# Patient Record
Sex: MALE | Race: WHITE | NOT HISPANIC OR LATINO | URBAN - METROPOLITAN AREA
[De-identification: names, ages, dates, MRNs, and addresses within clinical notes are randomized per-mention and may not be internally consistent; named-entity substitution may affect disease eponyms.]

---

## 2018-03-01 ENCOUNTER — NEW PATIENT (OUTPATIENT)
Dept: URBAN - METROPOLITAN AREA CLINIC 60 | Facility: CLINIC | Age: 28
End: 2018-03-01
Payer: COMMERCIAL

## 2018-03-01 DIAGNOSIS — H52.13 MYOPIA, BILATERAL: Primary | ICD-10-CM

## 2018-03-01 PROCEDURE — 92015 DETERMINE REFRACTIVE STATE: CPT | Performed by: OPTOMETRIST

## 2018-03-01 PROCEDURE — 92310 CONTACT LENS FITTING OU: CPT | Performed by: OPTOMETRIST

## 2018-03-01 PROCEDURE — 92004 COMPRE OPH EXAM NEW PT 1/>: CPT | Performed by: OPTOMETRIST

## 2018-03-01 ASSESSMENT — VISUAL ACUITY
OD: 20/20
OS: 20/20

## 2018-03-01 ASSESSMENT — INTRAOCULAR PRESSURE
OD: 20
OS: 19

## 2018-04-19 ENCOUNTER — FOLLOW UP ESTABLISHED (OUTPATIENT)
Dept: URBAN - METROPOLITAN AREA CLINIC 60 | Facility: CLINIC | Age: 28
End: 2018-04-19
Payer: COMMERCIAL

## 2018-04-19 DIAGNOSIS — H52.7 REFRACTIVE ERROR: Primary | ICD-10-CM

## 2018-04-19 PROCEDURE — 92015 DETERMINE REFRACTIVE STATE: CPT | Performed by: OPTOMETRIST

## 2021-10-27 ENCOUNTER — HOSPITAL ENCOUNTER (EMERGENCY)
Facility: MEDICAL CENTER | Age: 31
End: 2021-10-27
Attending: EMERGENCY MEDICINE
Payer: COMMERCIAL

## 2021-10-27 ENCOUNTER — APPOINTMENT (OUTPATIENT)
Dept: RADIOLOGY | Facility: MEDICAL CENTER | Age: 31
End: 2021-10-27
Attending: EMERGENCY MEDICINE
Payer: COMMERCIAL

## 2021-10-27 VITALS
HEIGHT: 72 IN | BODY MASS INDEX: 25.06 KG/M2 | SYSTOLIC BLOOD PRESSURE: 148 MMHG | RESPIRATION RATE: 18 BRPM | TEMPERATURE: 98.7 F | HEART RATE: 98 BPM | OXYGEN SATURATION: 98 % | WEIGHT: 185 LBS | DIASTOLIC BLOOD PRESSURE: 89 MMHG

## 2021-10-27 DIAGNOSIS — R56.9 SEIZURE (HCC): ICD-10-CM

## 2021-10-27 LAB
ALBUMIN SERPL BCP-MCNC: 4.3 G/DL (ref 3.2–4.9)
ALBUMIN/GLOB SERPL: 1.9 G/DL
ALP SERPL-CCNC: 84 U/L (ref 30–99)
ALT SERPL-CCNC: 21 U/L (ref 2–50)
ANION GAP SERPL CALC-SCNC: 13 MMOL/L (ref 7–16)
AST SERPL-CCNC: 31 U/L (ref 12–45)
BASOPHILS # BLD AUTO: 0.4 % (ref 0–1.8)
BASOPHILS # BLD: 0.07 K/UL (ref 0–0.12)
BILIRUB SERPL-MCNC: 0.2 MG/DL (ref 0.1–1.5)
BUN SERPL-MCNC: 12 MG/DL (ref 8–22)
CALCIUM SERPL-MCNC: 8.7 MG/DL (ref 8.5–10.5)
CHLORIDE SERPL-SCNC: 106 MMOL/L (ref 96–112)
CO2 SERPL-SCNC: 21 MMOL/L (ref 20–33)
CREAT SERPL-MCNC: 1.17 MG/DL (ref 0.5–1.4)
EOSINOPHIL # BLD AUTO: 0.11 K/UL (ref 0–0.51)
EOSINOPHIL NFR BLD: 0.7 % (ref 0–6.9)
ERYTHROCYTE [DISTWIDTH] IN BLOOD BY AUTOMATED COUNT: 42.7 FL (ref 35.9–50)
GLOBULIN SER CALC-MCNC: 2.3 G/DL (ref 1.9–3.5)
GLUCOSE SERPL-MCNC: 94 MG/DL (ref 65–99)
HCT VFR BLD AUTO: 50.5 % (ref 42–52)
HGB BLD-MCNC: 16.9 G/DL (ref 14–18)
IMM GRANULOCYTES # BLD AUTO: 0.13 K/UL (ref 0–0.11)
IMM GRANULOCYTES NFR BLD AUTO: 0.8 % (ref 0–0.9)
LYMPHOCYTES # BLD AUTO: 1.13 K/UL (ref 1–4.8)
LYMPHOCYTES NFR BLD: 7.1 % (ref 22–41)
MCH RBC QN AUTO: 30.3 PG (ref 27–33)
MCHC RBC AUTO-ENTMCNC: 33.5 G/DL (ref 33.7–35.3)
MCV RBC AUTO: 90.7 FL (ref 81.4–97.8)
MONOCYTES # BLD AUTO: 0.57 K/UL (ref 0–0.85)
MONOCYTES NFR BLD AUTO: 3.6 % (ref 0–13.4)
NEUTROPHILS # BLD AUTO: 14 K/UL (ref 1.82–7.42)
NEUTROPHILS NFR BLD: 87.4 % (ref 44–72)
NRBC # BLD AUTO: 0 K/UL
NRBC BLD-RTO: 0 /100 WBC
PLATELET # BLD AUTO: 311 K/UL (ref 164–446)
PMV BLD AUTO: 9.6 FL (ref 9–12.9)
POTASSIUM SERPL-SCNC: 4.5 MMOL/L (ref 3.6–5.5)
PROT SERPL-MCNC: 6.6 G/DL (ref 6–8.2)
RBC # BLD AUTO: 5.57 M/UL (ref 4.7–6.1)
SODIUM SERPL-SCNC: 140 MMOL/L (ref 135–145)
WBC # BLD AUTO: 16 K/UL (ref 4.8–10.8)

## 2021-10-27 PROCEDURE — 85025 COMPLETE CBC W/AUTO DIFF WBC: CPT

## 2021-10-27 PROCEDURE — 70450 CT HEAD/BRAIN W/O DYE: CPT

## 2021-10-27 PROCEDURE — A9270 NON-COVERED ITEM OR SERVICE: HCPCS | Performed by: EMERGENCY MEDICINE

## 2021-10-27 PROCEDURE — 700102 HCHG RX REV CODE 250 W/ 637 OVERRIDE(OP): Performed by: EMERGENCY MEDICINE

## 2021-10-27 PROCEDURE — 80053 COMPREHEN METABOLIC PANEL: CPT

## 2021-10-27 PROCEDURE — 99284 EMERGENCY DEPT VISIT MOD MDM: CPT

## 2021-10-27 RX ORDER — CHLORDIAZEPOXIDE HYDROCHLORIDE 25 MG/1
25 CAPSULE, GELATIN COATED ORAL ONCE
Status: COMPLETED | OUTPATIENT
Start: 2021-10-27 | End: 2021-10-27

## 2021-10-27 RX ORDER — CHLORDIAZEPOXIDE HYDROCHLORIDE 10 MG/1
10 CAPSULE, GELATIN COATED ORAL 3 TIMES DAILY PRN
Qty: 5 CAPSULE | Refills: 0 | Status: SHIPPED | OUTPATIENT
Start: 2021-10-27 | End: 2021-10-27 | Stop reason: SDUPTHER

## 2021-10-27 RX ORDER — CHLORDIAZEPOXIDE HYDROCHLORIDE 10 MG/1
10 CAPSULE, GELATIN COATED ORAL 3 TIMES DAILY PRN
Qty: 5 CAPSULE | Refills: 0 | Status: SHIPPED | OUTPATIENT
Start: 2021-10-27 | End: 2021-10-30

## 2021-10-27 RX ADMIN — CHLORDIAZEPOXIDE HYDROCHLORIDE 25 MG: 25 CAPSULE ORAL at 11:59

## 2021-10-27 NOTE — ED PROVIDER NOTES
ED Provider Note    ED Provider Note    Primary care provider: Pcp Pt States None  Means of arrival: EMS  History obtained from: Patient    CHIEF COMPLAINT  Chief Complaint   Patient presents with   • Seizure     BIB EMS from home where pt's mother found him to be havnig a tonic clonic like seizure activity. +oral trauma. -incontinence     Seen at 11:21 AM.   HPI  Jani Henry is a 31 y.o. male who presents to the Emergency Department for possible alcohol withdrawal. Patient denies any history of seizures. He does drink nightly, his alcohol consumption consists of 2 to 3 alcoholic beverages, he does not usually drink in the morning. Over the past few days he has decreased his alcohol consumption and is just had a few beers. He is amnesty to the event but apparently had a generalized tonic chronic seizure and suffered a tongue laceration. He is back to his usual state of health. He denies any headache, numbness, weakness, visual changes, nausea or vomiting. He does have a prescription for Xanax, he states his last Xanax was taken probably three or four days ago. He moved here from the [facility], he still is intending to follow up with his treating psychiatrist down there.    REVIEW OF SYSTEMS  See HPI,   Remainder of ROS negative.     PAST MEDICAL HISTORY   has a past medical history of Anxiety and Bipolar 1 disorder (HCC).    SURGICAL HISTORY  patient denies any surgical history    SOCIAL HISTORY  Social History     Tobacco Use   • Smoking status: Never Smoker   • Smokeless tobacco: Never Used   Vaping Use   • Vaping Use: Never used   Substance Use Topics   • Alcohol use: Yes     Comment: high etoh    • Drug use: Not Currently      Social History     Substance and Sexual Activity   Drug Use Not Currently       FAMILY HISTORY  History reviewed. No pertinent family history.    CURRENT MEDICATIONS  Reviewed.  See Encounter Summary.     ALLERGIES  No Known Allergies    PHYSICAL EXAM  VITAL SIGNS: /89   Pulse 98    Temp 37.1 °C (98.7 °F)   Resp 18   Ht 1.829 m (6')   Wt 83.9 kg (185 lb)   SpO2 98%   BMI 25.09 kg/m²   Constitutional: Awake, alert in no apparent distress.   HENT: Normocephalic, Bilateral external ears normal. Nose normal. Ecchymosis noted to the right tip of the tongue, no active bleeding  Eyes: Conjunctiva normal, non-icteric, EOMI.    Thorax & Lungs: Easy unlabored respirations, Clear to ascultation bilaterally.  Cardiovascular: Regular rate, Regular rhythm, No murmurs, rubs or gallops. Bilateral pulses symmetrical.   Abdomen:  Soft, nontender, nondistended, normal active bowel sounds.   :    Skin: Visualized skin is  Dry, No erythema, No rash.   Musculoskeletal:   No cyanosis, clubbing or edema. No leg asymmetry.   Neurologic: Alert, Grossly non-focal. Cranial nerves two through 12 intact, normal speech, no dysmetria, no drift, non-tremulous.  Psychiatric: Normal affect, Normal mood  Lymphatic:  No cervical LAD         RADIOLOGY  CT-HEAD W/O   Final Result      Head CT without contrast within normal limits. No evidence of acute cerebral infarction, hemorrhage or mass lesion.            COURSE & MEDICAL DECISION MAKING  Pertinent Labs & Imaging studies reviewed. (See chart for details)    Differential diagnoses include but are not limited to: most likely seizure, other consideration would be alcohol withdrawal, mass occupying lesion, electrolyte abnormalities    11:21 AM - Medical record reviewed, history of bipolar, anxiety, last visit with psychiatry approximately three weeks ago in [facility] where the patient had 45 tablets of Xanax failed.    Decision Making:  This is a pleasant 31 y.o. year old male who presents with a single generalized tonic clonic seizure. The patient currently does not demonstrate DTs. I suspect that this is multifactorial. Likely the patient had a combination of mild benzo and mild alcohol withdrawal which was causing a single breakthrough seizure today. Last imaging  of the brain was in 2007, therefore a repeat head CT was done today to rule out mass activating lesion. CT is negative. Labs are unremarkable, with exception of WBC of 16 initial predominance, presumably stress response. The patient is back to baseline. With this clinical scenario he does not require long-term antiepileptics. I will give him a few tablets of Librium to prevent further seizures for the next 24 hours and referred him to the health clinic for detox if needed.    Discharge Medications:  Discharge Medication List as of 10/27/2021 12:59 PM          The patient was discharged home (see d/c instructions) was told to return immediately for any signs or symptoms listed, or any worsening at all.  The patient verbally agreed to the discharge precautions and follow-up plan which is documented in EPIC.    In prescribing controlled substances to this patient, I certify that I have obtained and reviewed the medical history of Jani Henry. I have also made a good kaylan effort to obtain applicable records from other providers who have treated the patient and records did not demonstrate any increased risk of substance abuse that would prevent me from prescribing controlled substances.     I have conducted a physical exam and documented it. I have reviewed Mr. Henry’s prescription history as maintained by the [place] Prescription Monitoring Program.             FINAL IMPRESSION  1. Seizure (HCC)

## 2021-10-27 NOTE — ED TRIAGE NOTES
Chief Complaint   Patient presents with   • Seizure     BIB EMS from home where pt's mother found him to be havnig a tonic clonic like seizure activity. +oral trauma. -incontinence     Pt reports that he normally drinks heavily daily, has not had any ETOH last 2 days. Pt reports that he told his brother yesterday that he though he may be having alcohol withdrawal.    Pt placed on monitor. Chart up for ERP

## 2021-11-28 ENCOUNTER — HOSPITAL ENCOUNTER (EMERGENCY)
Facility: MEDICAL CENTER | Age: 31
End: 2021-11-28
Attending: EMERGENCY MEDICINE

## 2021-11-28 VITALS
HEIGHT: 72 IN | BODY MASS INDEX: 25.06 KG/M2 | WEIGHT: 185 LBS | RESPIRATION RATE: 18 BRPM | OXYGEN SATURATION: 92 % | HEART RATE: 84 BPM | DIASTOLIC BLOOD PRESSURE: 73 MMHG | TEMPERATURE: 99 F | SYSTOLIC BLOOD PRESSURE: 119 MMHG

## 2021-11-28 DIAGNOSIS — R56.9 SEIZURE (HCC): ICD-10-CM

## 2021-11-28 LAB
ALBUMIN SERPL BCP-MCNC: 2.9 G/DL (ref 3.2–4.9)
ALBUMIN/GLOB SERPL: 2.2 G/DL
ALP SERPL-CCNC: 59 U/L (ref 30–99)
ALT SERPL-CCNC: 10 U/L (ref 2–50)
AMPHET UR QL SCN: NEGATIVE
ANION GAP SERPL CALC-SCNC: 8 MMOL/L (ref 7–16)
AST SERPL-CCNC: 12 U/L (ref 12–45)
BARBITURATES UR QL SCN: NEGATIVE
BASOPHILS # BLD AUTO: 0.9 % (ref 0–1.8)
BASOPHILS # BLD: 0.08 K/UL (ref 0–0.12)
BENZODIAZ UR QL SCN: NEGATIVE
BILIRUB SERPL-MCNC: 0.3 MG/DL (ref 0.1–1.5)
BUN SERPL-MCNC: 7 MG/DL (ref 8–22)
BZE UR QL SCN: NEGATIVE
CA-I SERPL-SCNC: 1.2 MMOL/L (ref 1.1–1.3)
CALCIUM SERPL-MCNC: 6.3 MG/DL (ref 8.5–10.5)
CANNABINOIDS UR QL SCN: POSITIVE
CHLORIDE SERPL-SCNC: 114 MMOL/L (ref 96–112)
CO2 SERPL-SCNC: 21 MMOL/L (ref 20–33)
CREAT SERPL-MCNC: 0.71 MG/DL (ref 0.5–1.4)
EOSINOPHIL # BLD AUTO: 0.23 K/UL (ref 0–0.51)
EOSINOPHIL NFR BLD: 2.6 % (ref 0–6.9)
ERYTHROCYTE [DISTWIDTH] IN BLOOD BY AUTOMATED COUNT: 43.5 FL (ref 35.9–50)
ETHANOL BLD-MCNC: <10.1 MG/DL (ref 0–10)
GLOBULIN SER CALC-MCNC: 1.3 G/DL (ref 1.9–3.5)
GLUCOSE SERPL-MCNC: 86 MG/DL (ref 65–99)
HCT VFR BLD AUTO: 49.1 % (ref 42–52)
HGB BLD-MCNC: 16.3 G/DL (ref 14–18)
IMM GRANULOCYTES # BLD AUTO: 0.05 K/UL (ref 0–0.11)
IMM GRANULOCYTES NFR BLD AUTO: 0.6 % (ref 0–0.9)
LYMPHOCYTES # BLD AUTO: 2.32 K/UL (ref 1–4.8)
LYMPHOCYTES NFR BLD: 26 % (ref 22–41)
MAGNESIUM SERPL-MCNC: 1.6 MG/DL (ref 1.5–2.5)
MCH RBC QN AUTO: 30.8 PG (ref 27–33)
MCHC RBC AUTO-ENTMCNC: 33.2 G/DL (ref 33.7–35.3)
MCV RBC AUTO: 92.8 FL (ref 81.4–97.8)
METHADONE UR QL SCN: NEGATIVE
MONOCYTES # BLD AUTO: 0.7 K/UL (ref 0–0.85)
MONOCYTES NFR BLD AUTO: 7.8 % (ref 0–13.4)
NEUTROPHILS # BLD AUTO: 5.54 K/UL (ref 1.82–7.42)
NEUTROPHILS NFR BLD: 62.1 % (ref 44–72)
NRBC # BLD AUTO: 0 K/UL
NRBC BLD-RTO: 0 /100 WBC
OPIATES UR QL SCN: NEGATIVE
OXYCODONE UR QL SCN: NEGATIVE
PCP UR QL SCN: NEGATIVE
PLATELET # BLD AUTO: 354 K/UL (ref 164–446)
PMV BLD AUTO: 9.6 FL (ref 9–12.9)
POTASSIUM SERPL-SCNC: 3 MMOL/L (ref 3.6–5.5)
PROPOXYPH UR QL SCN: NEGATIVE
PROT SERPL-MCNC: 4.2 G/DL (ref 6–8.2)
RBC # BLD AUTO: 5.29 M/UL (ref 4.7–6.1)
SODIUM SERPL-SCNC: 143 MMOL/L (ref 135–145)
WBC # BLD AUTO: 8.9 K/UL (ref 4.8–10.8)

## 2021-11-28 PROCEDURE — 80307 DRUG TEST PRSMV CHEM ANLYZR: CPT

## 2021-11-28 PROCEDURE — 99284 EMERGENCY DEPT VISIT MOD MDM: CPT

## 2021-11-28 PROCEDURE — 82077 ASSAY SPEC XCP UR&BREATH IA: CPT

## 2021-11-28 PROCEDURE — 700102 HCHG RX REV CODE 250 W/ 637 OVERRIDE(OP): Performed by: EMERGENCY MEDICINE

## 2021-11-28 PROCEDURE — A9270 NON-COVERED ITEM OR SERVICE: HCPCS | Performed by: EMERGENCY MEDICINE

## 2021-11-28 PROCEDURE — 36415 COLL VENOUS BLD VENIPUNCTURE: CPT

## 2021-11-28 PROCEDURE — 83735 ASSAY OF MAGNESIUM: CPT

## 2021-11-28 PROCEDURE — 80053 COMPREHEN METABOLIC PANEL: CPT

## 2021-11-28 PROCEDURE — 82330 ASSAY OF CALCIUM: CPT

## 2021-11-28 PROCEDURE — 85025 COMPLETE CBC W/AUTO DIFF WBC: CPT

## 2021-11-28 RX ORDER — PHENYTOIN SODIUM 100 MG/1
100 CAPSULE, EXTENDED RELEASE ORAL 3 TIMES DAILY
Qty: 90 CAPSULE | Refills: 11 | Status: SHIPPED | OUTPATIENT
Start: 2021-11-28 | End: 2022-11-20

## 2021-11-28 RX ORDER — PHENYTOIN SODIUM 100 MG/1
300 CAPSULE, EXTENDED RELEASE ORAL ONCE
Status: COMPLETED | OUTPATIENT
Start: 2021-11-28 | End: 2021-11-28

## 2021-11-28 RX ORDER — POTASSIUM CHLORIDE 20 MEQ/1
40 TABLET, EXTENDED RELEASE ORAL ONCE
Status: COMPLETED | OUTPATIENT
Start: 2021-11-28 | End: 2021-11-28

## 2021-11-28 RX ADMIN — POTASSIUM CHLORIDE 40 MEQ: 1500 TABLET, EXTENDED RELEASE ORAL at 09:58

## 2021-11-28 RX ADMIN — PHENYTOIN SODIUM 300 MG: 100 CAPSULE ORAL at 11:54

## 2021-11-28 ASSESSMENT — FIBROSIS 4 INDEX: FIB4 SCORE: 0.7

## 2021-11-28 NOTE — PROGRESS NOTES
Brief neurology note    31-year-old male presents with seizure activity.  He said a couple others.  One was in October at this facility.  Just moved here from [facility].  Underlying alcohol abuse.  Takes Xanax at times.  In October is felt the seizure is most likely due to combination of alcohol withdrawal possibly benzo withdrawal.  Underlying psychological disorders including bipolar and depression.  Chart review he was going to a psychiatry clinic in [facility].  Alcohol levels negative today.  UDS is still pending.  Given this combination of issues.  I agree with starting an AED.  However choice of the medication is complicated.  Would not give him Keppra due to the underlying psych disorders as Keppra can make this worse.  With the alcohol abuse and possibly other drug abuse would not use Depakote right now.  The best option in my opinion the long-term would be Lamictal however this will take multiple months tapering.  In the meantime we can start Dilantin 300 mg nightly.  He will need to follow-up in the neurology clinic get an EEG.  Also possibly an MRI brain.  Please note this Dilantin recommendation is only for the short-term.  Long-term needs to be on something else such as Lamictal or maybe Topamax.      Jonas Mondragon M.D.

## 2021-11-28 NOTE — ED NOTES
Tech from Lab called with critical result of calcium of 6.3 at 0923. Critical lab result read back to .   Dr. Baxter notified of critical lab result at 0924.  Critical lab result read back by Dr. Baxter.

## 2021-11-28 NOTE — ED TRIAGE NOTES
"Jani Henry 31 y.o. male bib EMS from home for     Chief Complaint   Patient presents with   • Seizure     found by family down actively seizing, unknown time.  Pt reports \"this is the 3rd time it has happened\".  Pt does not remember if he takes medications for SZ.   • Tongue Swelling     pt reports biting his tongue, c/o mild pain     Report was rec'd by Roro AMIN.  PIV placed by EMS pta with FSBS 136.  Blood drawn on arrival by NOC rn, sent to the lab.  /69   Pulse 89   Temp 36.4 °C (97.6 °F) (Temporal)   Resp 14   Ht 1.829 m (6')   Wt 83.9 kg (185 lb)   SpO2 95%   BMI 25.09 kg/m²     "

## 2021-11-28 NOTE — ED NOTES
Pt given d/c instructions, f/u info and aware of RX x 1 for  with verbal understanding.  VSS at discharge.  PIV d/c'd with tip intact.  Pt ambulatory from the ED w/ steady gait accompanied by family member.  All belongings in possession on discharge.  Pt escorted to the lobby by RN.

## 2021-11-28 NOTE — ED PROVIDER NOTES
"ED Provider Note    CHIEF COMPLAINT  Chief Complaint   Patient presents with   • Seizure     found by family down actively seizing, unknown time.  Pt reports \"this is the 3rd time it has happened\".  Pt does not remember if he takes medications for SZ.   • Tongue Swelling     pt reports biting his tongue, c/o mild pain       HPI  Jani Henry is a 31 y.o. male who presents to the emergency department after having a seizure.  Patient has a history of previous seizures.  He had one in May and then in July.  On review of the chart he had one in October of this year.  His first 2 were attributed to alcohol as he is a heavy drinker and had stopped drinking.  He has a history of anxiety.  He was taking Xanax up until about 3 weeks ago when this was discontinued.  He denies any abrupt cessation of alcohol with this event.  Does not know what happened.  He is currently living with family.  He was making some noises and he was checked on.  He was identified to have generalized tonic-clonic activity.  Paramedics were called.  Seizure aborted spontaneously.  He is confused regarding the event.  His only complaint is of tongue pain.  He has pain on the tip and the right side of the tongue.  Throbbing nonradiating constant.  Denies a headache.  No focal weakness numbness.  Denies extremity injury, chest pain, shortness of breath, fevers or illness.    REVIEW OF SYSTEMS  As per HPI, otherwise a 10 point review of systems is negative    PAST MEDICAL HISTORY  Past Medical History:   Diagnosis Date   • Anxiety    • Bipolar 1 disorder (HCC)        Family history  No pertinent family medical history    SOCIAL HISTORY  Social History     Tobacco Use   • Smoking status: Never Smoker   • Smokeless tobacco: Never Used   Vaping Use   • Vaping Use: Never used   Substance Use Topics   • Alcohol use: Yes     Comment: heavy etoh    • Drug use: Not Currently       SURGICAL HISTORY  No past surgical history on file.    CURRENT MEDICATIONS  Home " Medications    **Home medications have not yet been reviewed for this encounter**         ALLERGIES  No Known Allergies    PHYSICAL EXAM  VITAL SIGNS: /69   Pulse 89   Temp 36.4 °C (97.6 °F) (Temporal)   Resp 14   Ht 1.829 m (6')   Wt 83.9 kg (185 lb)   SpO2 95%   BMI 25.09 kg/m²    Constitutional: Awake and alert  HENT: Contusion on the tip and right side of the tongue.  Eyes: Normal inspection  Neck: Grossly normal range of motion.  Cardiovascular: Normal heart rate, Normal rhythm.  Symmetric peripheral pulses.   Thorax & Lungs: No respiratory distress, No wheezing, No rales, No rhonchi, No chest tenderness.   Abdomen: Bowel sounds normal, soft, non-distended, nontender, no mass  Skin: No obvious rash.  Back: No tenderness  Extremities: Well perfused  Neurologic: Awake alert oriented to person and place.  Disoriented to time.  Moves all extremities symmetrically.  Normal sensory.  Psychiatric: Normal for situation        Labs:  Results for orders placed or performed during the hospital encounter of 11/28/21   CBC WITH DIFFERENTIAL   Result Value Ref Range    WBC 8.9 4.8 - 10.8 K/uL    RBC 5.29 4.70 - 6.10 M/uL    Hemoglobin 16.3 14.0 - 18.0 g/dL    Hematocrit 49.1 42.0 - 52.0 %    MCV 92.8 81.4 - 97.8 fL    MCH 30.8 27.0 - 33.0 pg    MCHC 33.2 (L) 33.7 - 35.3 g/dL    RDW 43.5 35.9 - 50.0 fL    Platelet Count 354 164 - 446 K/uL    MPV 9.6 9.0 - 12.9 fL    Neutrophils-Polys 62.10 44.00 - 72.00 %    Lymphocytes 26.00 22.00 - 41.00 %    Monocytes 7.80 0.00 - 13.40 %    Eosinophils 2.60 0.00 - 6.90 %    Basophils 0.90 0.00 - 1.80 %    Immature Granulocytes 0.60 0.00 - 0.90 %    Nucleated RBC 0.00 /100 WBC    Neutrophils (Absolute) 5.54 1.82 - 7.42 K/uL    Lymphs (Absolute) 2.32 1.00 - 4.80 K/uL    Monos (Absolute) 0.70 0.00 - 0.85 K/uL    Eos (Absolute) 0.23 0.00 - 0.51 K/uL    Baso (Absolute) 0.08 0.00 - 0.12 K/uL    Immature Granulocytes (abs) 0.05 0.00 - 0.11 K/uL    NRBC (Absolute) 0.00 K/uL   Comp  Metabolic Panel   Result Value Ref Range    Sodium 143 135 - 145 mmol/L    Potassium 3.0 (L) 3.6 - 5.5 mmol/L    Chloride 114 (H) 96 - 112 mmol/L    Co2 21 20 - 33 mmol/L    Anion Gap 8.0 7.0 - 16.0    Glucose 86 65 - 99 mg/dL    Bun 7 (L) 8 - 22 mg/dL    Creatinine 0.71 0.50 - 1.40 mg/dL    Calcium 6.3 (LL) 8.5 - 10.5 mg/dL    AST(SGOT) 12 12 - 45 U/L    ALT(SGPT) 10 2 - 50 U/L    Alkaline Phosphatase 59 30 - 99 U/L    Total Bilirubin 0.3 0.1 - 1.5 mg/dL    Albumin 2.9 (L) 3.2 - 4.9 g/dL    Total Protein 4.2 (L) 6.0 - 8.2 g/dL    Globulin 1.3 (L) 1.9 - 3.5 g/dL    A-G Ratio 2.2 g/dL   DIAGNOSTIC ALCOHOL   Result Value Ref Range    Diagnostic Alcohol <10.1 0.0 - 10.0 mg/dL   MAGNESIUM   Result Value Ref Range    Magnesium 1.6 1.5 - 2.5 mg/dL   ESTIMATED GFR   Result Value Ref Range    GFR If African American >60 >60 mL/min/1.73 m 2    GFR If Non African American >60 >60 mL/min/1.73 m 2   IONIZED CALCIUM   Result Value Ref Range    Ionized Calcium 1.2 1.1 - 1.3 mmol/L       COURSE & MEDICAL DECISION MAKING  Patient presents to the ER with reported seizure.  He is slightly confused on arrival but not frankly postictal.  His neurologic examination was otherwise unremarkable.  Reviewed his chart as noted above.  Ordered screening laboratory data.    Laboratory data demonstrates low potassium.  Patient was given K-Dur orally.  His calcium level is also low direct ionized calcium returned normal.    His neurologic status completely returned to normal.  I consulted neurology.  Reviewed the case with Dr. Mondragon.  Advised initiation of Dilantin and follow-up in the epilepsy clinic.  Placed referral to neurology as well as with our .  Given first dose of Dilantin in the emergency department and a prescription for 100 mg 3 times daily.  Discussed patient's problematic drinking.  He did not want any resources or help with any detox as he did not believe this was much of an issue and he thought he could stop.  Patient  admitted to using kratom on a regular basis.  I advised against this.  At this point the patient is stable.  He should return to the ER for recurrent seizures or concerning symptoms.    FINAL IMPRESSION  1.  Seizure    Driving restrictions were given.    This dictation was created using voice recognition software. The accuracy of the dictation is limited to the abilities of the software.  The nursing notes were reviewed and certain aspects of this information were incorporated into this note.      Electronically signed by: Chris Baxter M.D., 11/28/2021 7:50 AM

## 2022-02-17 ENCOUNTER — APPOINTMENT (OUTPATIENT)
Dept: NEUROLOGY | Facility: MEDICAL CENTER | Age: 32
End: 2022-02-17
Attending: STUDENT IN AN ORGANIZED HEALTH CARE EDUCATION/TRAINING PROGRAM
Payer: COMMERCIAL

## 2022-07-27 ENCOUNTER — HOSPITAL ENCOUNTER (EMERGENCY)
Facility: MEDICAL CENTER | Age: 32
End: 2022-07-27
Attending: EMERGENCY MEDICINE

## 2022-07-27 VITALS
WEIGHT: 185 LBS | TEMPERATURE: 98.8 F | SYSTOLIC BLOOD PRESSURE: 119 MMHG | DIASTOLIC BLOOD PRESSURE: 67 MMHG | HEART RATE: 84 BPM | RESPIRATION RATE: 18 BRPM | BODY MASS INDEX: 25.06 KG/M2 | OXYGEN SATURATION: 94 % | HEIGHT: 72 IN

## 2022-07-27 DIAGNOSIS — R56.9 SEIZURE (HCC): ICD-10-CM

## 2022-07-27 LAB
ALBUMIN SERPL BCP-MCNC: 4.4 G/DL (ref 3.2–4.9)
ALBUMIN/GLOB SERPL: 1.7 G/DL
ALP SERPL-CCNC: 93 U/L (ref 30–99)
ALT SERPL-CCNC: 22 U/L (ref 2–50)
ANION GAP SERPL CALC-SCNC: 18 MMOL/L (ref 7–16)
AST SERPL-CCNC: 31 U/L (ref 12–45)
BASOPHILS # BLD AUTO: 0.6 % (ref 0–1.8)
BASOPHILS # BLD: 0.09 K/UL (ref 0–0.12)
BILIRUB SERPL-MCNC: 0.2 MG/DL (ref 0.1–1.5)
BUN SERPL-MCNC: 18 MG/DL (ref 8–22)
CALCIUM SERPL-MCNC: 8.8 MG/DL (ref 8.5–10.5)
CHLORIDE SERPL-SCNC: 102 MMOL/L (ref 96–112)
CO2 SERPL-SCNC: 19 MMOL/L (ref 20–33)
CREAT SERPL-MCNC: 1.37 MG/DL (ref 0.5–1.4)
EOSINOPHIL # BLD AUTO: 0.07 K/UL (ref 0–0.51)
EOSINOPHIL NFR BLD: 0.5 % (ref 0–6.9)
ERYTHROCYTE [DISTWIDTH] IN BLOOD BY AUTOMATED COUNT: 42.1 FL (ref 35.9–50)
GFR SERPLBLD CREATININE-BSD FMLA CKD-EPI: 70 ML/MIN/1.73 M 2
GLOBULIN SER CALC-MCNC: 2.6 G/DL (ref 1.9–3.5)
GLUCOSE SERPL-MCNC: 123 MG/DL (ref 65–99)
HCT VFR BLD AUTO: 47 % (ref 42–52)
HGB BLD-MCNC: 16.5 G/DL (ref 14–18)
IMM GRANULOCYTES # BLD AUTO: 0.09 K/UL (ref 0–0.11)
IMM GRANULOCYTES NFR BLD AUTO: 0.6 % (ref 0–0.9)
LYMPHOCYTES # BLD AUTO: 1.58 K/UL (ref 1–4.8)
LYMPHOCYTES NFR BLD: 10.9 % (ref 22–41)
MCH RBC QN AUTO: 32.2 PG (ref 27–33)
MCHC RBC AUTO-ENTMCNC: 35.1 G/DL (ref 33.7–35.3)
MCV RBC AUTO: 91.6 FL (ref 81.4–97.8)
MONOCYTES # BLD AUTO: 0.68 K/UL (ref 0–0.85)
MONOCYTES NFR BLD AUTO: 4.7 % (ref 0–13.4)
NEUTROPHILS # BLD AUTO: 11.99 K/UL (ref 1.82–7.42)
NEUTROPHILS NFR BLD: 82.7 % (ref 44–72)
NRBC # BLD AUTO: 0 K/UL
NRBC BLD-RTO: 0 /100 WBC
PLATELET # BLD AUTO: 384 K/UL (ref 164–446)
PMV BLD AUTO: 9.9 FL (ref 9–12.9)
POTASSIUM SERPL-SCNC: 4 MMOL/L (ref 3.6–5.5)
PROT SERPL-MCNC: 7 G/DL (ref 6–8.2)
RBC # BLD AUTO: 5.13 M/UL (ref 4.7–6.1)
SODIUM SERPL-SCNC: 139 MMOL/L (ref 135–145)
WBC # BLD AUTO: 14.5 K/UL (ref 4.8–10.8)

## 2022-07-27 PROCEDURE — 94760 N-INVAS EAR/PLS OXIMETRY 1: CPT

## 2022-07-27 PROCEDURE — 700111 HCHG RX REV CODE 636 W/ 250 OVERRIDE (IP)

## 2022-07-27 PROCEDURE — 80177 DRUG SCRN QUAN LEVETIRACETAM: CPT

## 2022-07-27 PROCEDURE — 85025 COMPLETE CBC W/AUTO DIFF WBC: CPT

## 2022-07-27 PROCEDURE — 96374 THER/PROPH/DIAG INJ IV PUSH: CPT

## 2022-07-27 PROCEDURE — 99285 EMERGENCY DEPT VISIT HI MDM: CPT

## 2022-07-27 PROCEDURE — A9270 NON-COVERED ITEM OR SERVICE: HCPCS | Performed by: EMERGENCY MEDICINE

## 2022-07-27 PROCEDURE — 80053 COMPREHEN METABOLIC PANEL: CPT

## 2022-07-27 PROCEDURE — 700102 HCHG RX REV CODE 250 W/ 637 OVERRIDE(OP): Performed by: EMERGENCY MEDICINE

## 2022-07-27 PROCEDURE — 36415 COLL VENOUS BLD VENIPUNCTURE: CPT

## 2022-07-27 RX ORDER — ONDANSETRON 2 MG/ML
4 INJECTION INTRAMUSCULAR; INTRAVENOUS ONCE
Status: COMPLETED | OUTPATIENT
Start: 2022-07-27 | End: 2022-07-27

## 2022-07-27 RX ORDER — LORAZEPAM 1 MG/1
1 TABLET ORAL ONCE
Status: COMPLETED | OUTPATIENT
Start: 2022-07-27 | End: 2022-07-27

## 2022-07-27 RX ORDER — LORAZEPAM 2 MG/ML
1 INJECTION INTRAMUSCULAR ONCE
Status: DISCONTINUED | OUTPATIENT
Start: 2022-07-27 | End: 2022-07-27

## 2022-07-27 RX ADMIN — ONDANSETRON 4 MG: 2 INJECTION INTRAMUSCULAR; INTRAVENOUS at 10:27

## 2022-07-27 RX ADMIN — LORAZEPAM 1 MG: 1 TABLET ORAL at 08:50

## 2022-07-27 ASSESSMENT — FIBROSIS 4 INDEX: FIB4 SCORE: 0.34

## 2022-07-27 NOTE — ED NOTES
Discharge teaching and paperwork provided regarding seizures and importance of taking medications and all questions/concerns answered. VSS, neuro assessment stable and PIV removed. Given information regarding home care and reasons to follow up with ED or primary MD. Clled security and requested clothes be sent down for pt.

## 2022-07-27 NOTE — DISCHARGE INSTRUCTIONS
1.  Please make sure that you take your medication for seizures, Keppra, as directed and not miss any doses; 2.  Follow-up with your neurologist as well as primary care

## 2022-07-27 NOTE — ED NOTES
Pt reporting nausea, ERP at bedside. Verbal order received. Medicated pt per MAR. Call light in reach.

## 2022-07-27 NOTE — ED TRIAGE NOTES
Chief Complaint   Patient presents with   • Seizure     BIB EMS from home. Per EMS pt had witnessed sz lasting approx 10 minutes. Pt arrives AAOx3 post ictal.       Per EMS, pt vomited x1 PTA and received 4mg zofran. Pt RA sat in the 80's placed on 2L. Pt remains post ictal. AAOx3 at this time. Per report pt also had COVID approx 2 weeks ago, but pt does not remember.   Pt placed on monitor. Blood drawn and sent to lab. Call light in place. Bed in low position. Safety maintained. Chart up for ERP.

## 2022-07-27 NOTE — ED PROVIDER NOTES
ED Provider Note    CHIEF COMPLAINT  Chief Complaint   Patient presents with   • Seizure     BIB EMS from home. Per EMS pt had witnessed sz lasting approx 10 minutes. Pt arrives AAOx3 post ictal.       HPI  Jani Henry is a 31 y.o. male who presents for evaluation of seizure activity.  The patient had a witnessed generalized tonic-clonic grand mal seizure lasting 10 minutes according to his brother at home.  The brother also told paramedics the patient had COVID 2 weeks ago.  Patient relates having had COVID last December.  Patient has been vaccinated.  Patient's had seizure activity for last couple years and is currently on Keppra 500 mg in the morning and 1000 mg before bedtime.  He states he thinks he may have missed some of his doses.  He has had no recent: Fever, cough, congestion, vomiting, diarrhea, rashes, headache, unilateral motor weakness or paresthesias.  No other acute symptomatology or complaints.    REVIEW OF SYSTEMS  See HPI for further details.  He denies health problems such as hypertension, diabetes, cardiopulmonary disorders, gastrointestinal disorders.  All other systems negative.    PAST MEDICAL HISTORY  Past Medical History:   Diagnosis Date   • Anxiety    • Bipolar 1 disorder (HCC)    • Seizure (HCC)        FAMILY HISTORY  History reviewed. No pertinent family history.    SOCIAL HISTORY  Patient states he only drinks couple drinks a week.  He does indicate that he had problems with alcohol abuse in the past but has not been drinking excessively recently.  States he smokes marijuana but denies other drug use.    SURGICAL HISTORY  History reviewed. No pertinent surgical history.    CURRENT MEDICATIONS  Home Medications     Reviewed by Cris Gonzalez R.N. (Registered Nurse) on 07/27/22 at 0818  Med List Status: Partial   Medication Last Dose Status   ALPRAZolam (XANAX PO)  Active   ARIPIPRAZOLE PO  Active   Cariprazine HCl (VRAYLAR PO)  Active   CITALOPRAM HYDROBROMIDE PO  Active    DESVENLAFAXINE SUCCINATE ER PO  Active   FINASTERIDE PO  Active   HYDROXYZINE HCL PO  Active   levETIRAcetam (KEPPRA PO)  Active   phenytoin ER (DILANTIN) 100 MG Cap  Active                ALLERGIES  No Known Allergies    PHYSICAL EXAM  VITAL SIGNS: /74   Pulse 91   Temp 37.2 °C (99 °F) (Temporal)   Resp 16   Ht 1.829 m (6')   Wt 83.9 kg (185 lb)   SpO2 92%   BMI 25.09 kg/m²    Constitutional: 31-year-old male, awake, oriented x3  HENT: Normocephalic, Atraumatic, Nares:Clear, Oropharynx: moist, well hydrated, posterior pharynx:clear   Eyes: PERRL, EOMI, Conjunctiva normal, No discharge.   Neck: Normal range of motion, No tenderness, Supple, No stridor.   Lymphatic: No lymphadenopathy noted.   Cardiovascular: Regular rate and rhythm without mumurs, gallups, rubs   Thorax & Lungs: Normal Equal breath sounds, No respiratory distress, No wheezing, no stridor, no rales. No chest tenderness.   Abdomen: Soft, nontender, nondistended, no organomegaly, positive bowel sounds normal in quality. No guarding or rebound.  Skin: Good skin turgor, pink, warm, dry. No rashes, petechiae, purpura. Normal capillary refill.   Back: No tenderness, No CVA tenderness.   Extremities: Intact distal pulses, No edema, No tenderness, No cyanosis,  Vascular: Pulses are 2+, symmetric in the upper and lower extremities.  Musculoskeletal: Good range of motion in all major joints. No tenderness to palpation or major deformities noted.   Neurologic: Alert & oriented x 3,  No gross focal deficits noted.   Psychiatric: Affect normal, Judgment normal, Mood normal.     COURSE & MEDICAL DECISION MAKING  Pertinent Labs & Imaging studies reviewed. (See chart for details)  1.  Monitor  2.  Saline lock  3.  Seizure precautions    Laboratory studies: CBC shows white count of 14.5, 82% neutrophils, 10% lymphocytes, 4% monocytes, hemoglobin 16.5 hematocrit 47.0; CMP shows a CO2 of 19 anion gap 18, random glucose 123, otherwise within  normal;    Discussion: At this time, the patient presents with history of seizure activity.  Patient appears that he has not been compliant.  Patient was observed.  Laboratory studies are consistent with seizure activity.  I do not see any indication patient can have any associated infection or stroke.  I explained to the patient that he cannot drive and the letter sent to the DMV. He indicates he is comfortable with this explanation disposition.    FINAL IMPRESSION  1. Seizure (HCC)           PLAN  1.  Appropriate discharge instructions given  2.  He was instructed not to drive and a letter to the DMV was sent  3.  Follow-up with neurology and primary care    Electronically signed by: Guy G Gansert, M.D., 7/27/2022 8:30 AM

## 2022-07-28 LAB — LEVETIRACETAM SERPL-MCNC: 2 UG/ML (ref 10–40)

## 2022-08-30 ENCOUNTER — HOSPITAL ENCOUNTER (EMERGENCY)
Facility: MEDICAL CENTER | Age: 32
End: 2022-08-30
Attending: EMERGENCY MEDICINE

## 2022-08-30 VITALS
WEIGHT: 180 LBS | TEMPERATURE: 98.8 F | BODY MASS INDEX: 24.38 KG/M2 | RESPIRATION RATE: 16 BRPM | OXYGEN SATURATION: 91 % | HEIGHT: 72 IN | HEART RATE: 97 BPM | SYSTOLIC BLOOD PRESSURE: 128 MMHG | DIASTOLIC BLOOD PRESSURE: 85 MMHG

## 2022-08-30 DIAGNOSIS — R56.9 SEIZURE (HCC): ICD-10-CM

## 2022-08-30 LAB
ALBUMIN SERPL BCP-MCNC: 3.8 G/DL (ref 3.2–4.9)
ALBUMIN/GLOB SERPL: 1.7 G/DL
ALP SERPL-CCNC: 82 U/L (ref 30–99)
ALT SERPL-CCNC: 25 U/L (ref 2–50)
ANION GAP SERPL CALC-SCNC: 14 MMOL/L (ref 7–16)
AST SERPL-CCNC: 27 U/L (ref 12–45)
BASOPHILS # BLD AUTO: 1 % (ref 0–1.8)
BASOPHILS # BLD: 0.09 K/UL (ref 0–0.12)
BILIRUB SERPL-MCNC: 0.3 MG/DL (ref 0.1–1.5)
BUN SERPL-MCNC: 12 MG/DL (ref 8–22)
CALCIUM SERPL-MCNC: 8.8 MG/DL (ref 8.5–10.5)
CHLORIDE SERPL-SCNC: 102 MMOL/L (ref 96–112)
CO2 SERPL-SCNC: 22 MMOL/L (ref 20–33)
CREAT SERPL-MCNC: 1.13 MG/DL (ref 0.5–1.4)
EOSINOPHIL # BLD AUTO: 0.21 K/UL (ref 0–0.51)
EOSINOPHIL NFR BLD: 2.4 % (ref 0–6.9)
ERYTHROCYTE [DISTWIDTH] IN BLOOD BY AUTOMATED COUNT: 42.8 FL (ref 35.9–50)
GFR SERPLBLD CREATININE-BSD FMLA CKD-EPI: 88 ML/MIN/1.73 M 2
GLOBULIN SER CALC-MCNC: 2.2 G/DL (ref 1.9–3.5)
GLUCOSE SERPL-MCNC: 113 MG/DL (ref 65–99)
HCT VFR BLD AUTO: 47.8 % (ref 42–52)
HGB BLD-MCNC: 16.1 G/DL (ref 14–18)
IMM GRANULOCYTES # BLD AUTO: 0.08 K/UL (ref 0–0.11)
IMM GRANULOCYTES NFR BLD AUTO: 0.9 % (ref 0–0.9)
LYMPHOCYTES # BLD AUTO: 2.66 K/UL (ref 1–4.8)
LYMPHOCYTES NFR BLD: 30.9 % (ref 22–41)
MAGNESIUM SERPL-MCNC: 1.7 MG/DL (ref 1.5–2.5)
MCH RBC QN AUTO: 31.1 PG (ref 27–33)
MCHC RBC AUTO-ENTMCNC: 33.7 G/DL (ref 33.7–35.3)
MCV RBC AUTO: 92.3 FL (ref 81.4–97.8)
MONOCYTES # BLD AUTO: 0.76 K/UL (ref 0–0.85)
MONOCYTES NFR BLD AUTO: 8.8 % (ref 0–13.4)
NEUTROPHILS # BLD AUTO: 4.81 K/UL (ref 1.82–7.42)
NEUTROPHILS NFR BLD: 56 % (ref 44–72)
NRBC # BLD AUTO: 0 K/UL
NRBC BLD-RTO: 0 /100 WBC
PLATELET # BLD AUTO: 307 K/UL (ref 164–446)
PMV BLD AUTO: 10.1 FL (ref 9–12.9)
POTASSIUM SERPL-SCNC: 4.2 MMOL/L (ref 3.6–5.5)
PROT SERPL-MCNC: 6 G/DL (ref 6–8.2)
RBC # BLD AUTO: 5.18 M/UL (ref 4.7–6.1)
SODIUM SERPL-SCNC: 138 MMOL/L (ref 135–145)
WBC # BLD AUTO: 8.6 K/UL (ref 4.8–10.8)

## 2022-08-30 PROCEDURE — 700111 HCHG RX REV CODE 636 W/ 250 OVERRIDE (IP)

## 2022-08-30 PROCEDURE — 83735 ASSAY OF MAGNESIUM: CPT

## 2022-08-30 PROCEDURE — 99284 EMERGENCY DEPT VISIT MOD MDM: CPT

## 2022-08-30 PROCEDURE — 700105 HCHG RX REV CODE 258

## 2022-08-30 PROCEDURE — 80053 COMPREHEN METABOLIC PANEL: CPT

## 2022-08-30 PROCEDURE — 85025 COMPLETE CBC W/AUTO DIFF WBC: CPT

## 2022-08-30 PROCEDURE — 96375 TX/PRO/DX INJ NEW DRUG ADDON: CPT

## 2022-08-30 PROCEDURE — 700111 HCHG RX REV CODE 636 W/ 250 OVERRIDE (IP): Performed by: EMERGENCY MEDICINE

## 2022-08-30 PROCEDURE — 94760 N-INVAS EAR/PLS OXIMETRY 1: CPT

## 2022-08-30 PROCEDURE — 96374 THER/PROPH/DIAG INJ IV PUSH: CPT

## 2022-08-30 PROCEDURE — 36415 COLL VENOUS BLD VENIPUNCTURE: CPT

## 2022-08-30 RX ORDER — LORAZEPAM 2 MG/ML
2 INJECTION INTRAMUSCULAR ONCE
Status: COMPLETED | OUTPATIENT
Start: 2022-08-30 | End: 2022-08-30

## 2022-08-30 RX ORDER — DESVENLAFAXINE 50 MG/1
50 TABLET, EXTENDED RELEASE ORAL DAILY
Status: SHIPPED | COMMUNITY
End: 2022-11-20

## 2022-08-30 RX ORDER — LORAZEPAM 2 MG/ML
INJECTION INTRAMUSCULAR
Status: COMPLETED
Start: 2022-08-30 | End: 2022-08-30

## 2022-08-30 RX ORDER — FINASTERIDE 5 MG/1
5 TABLET, FILM COATED ORAL DAILY
Status: SHIPPED | COMMUNITY
End: 2022-11-20

## 2022-08-30 RX ORDER — HYDROXYZINE HYDROCHLORIDE 25 MG/1
25-50 TABLET, FILM COATED ORAL 2 TIMES DAILY
Status: SHIPPED | COMMUNITY
Start: 2022-04-18 | End: 2022-11-20

## 2022-08-30 RX ORDER — MIDAZOLAM HYDROCHLORIDE 1 MG/ML
INJECTION INTRAMUSCULAR; INTRAVENOUS
Status: COMPLETED
Start: 2022-08-30 | End: 2022-08-30

## 2022-08-30 RX ORDER — LEVETIRACETAM 500 MG/1
1000 TABLET ORAL 2 TIMES DAILY
COMMUNITY

## 2022-08-30 RX ORDER — ARIPIPRAZOLE 30 MG/1
30 TABLET ORAL DAILY
Status: SHIPPED | COMMUNITY
End: 2022-11-20

## 2022-08-30 RX ORDER — CITALOPRAM 40 MG/1
40 TABLET ORAL DAILY
Status: SHIPPED | COMMUNITY
End: 2022-11-20

## 2022-08-30 RX ORDER — LEVETIRACETAM 500 MG/5ML
1500 INJECTION, SOLUTION, CONCENTRATE INTRAVENOUS ONCE
Status: COMPLETED | OUTPATIENT
Start: 2022-08-30 | End: 2022-08-30

## 2022-08-30 RX ORDER — SODIUM CHLORIDE 9 MG/ML
1000 INJECTION, SOLUTION INTRAVENOUS ONCE
Status: COMPLETED | OUTPATIENT
Start: 2022-08-30 | End: 2022-08-30

## 2022-08-30 RX ADMIN — SODIUM CHLORIDE 1000 ML: 9 INJECTION, SOLUTION INTRAVENOUS at 06:30

## 2022-08-30 RX ADMIN — LORAZEPAM 2 MG: 2 INJECTION INTRAMUSCULAR; INTRAVENOUS at 06:20

## 2022-08-30 RX ADMIN — LEVETIRACETAM 1500 MG: 100 INJECTION, SOLUTION INTRAVENOUS at 06:28

## 2022-08-30 RX ADMIN — LORAZEPAM 2 MG: 2 INJECTION INTRAMUSCULAR at 06:20

## 2022-08-30 ASSESSMENT — FIBROSIS 4 INDEX: FIB4 SCORE: 0.55

## 2022-08-30 NOTE — ED NOTES
Pt O2 saturation down to 85% on RA, Pt placed on 4L NC and O2 saturations are back up to 90-92%. Primary RN aware.

## 2022-08-30 NOTE — ED NOTES
Discharge paperwork provided, instructions given. AAOx4. Family at bedside for ride. Patient ambulated out to lobby with steady gait.

## 2022-08-30 NOTE — DISCHARGE INSTRUCTIONS
Follow-up with primary care or neurology this week for reevaluation, medication management.    Continue home medications as previously indicated.  Recommend abstaining from alcohol use as this may lower your seizure threshold.    Return the emergency department for recurrent or intractable seizure, altered mental status, focal weakness, fever, vomiting or other new concerns.

## 2022-08-30 NOTE — ED TRIAGE NOTES
Chief Complaint   Patient presents with    Seizure     Seizure tonight, per family approx 10 mins. Hx seizures. On Keppra.     BIB [facility] from home where, per family, pt had 10 minute seizure at home. Pt did not witness seizure, only heard it. Per EMS, pt has seizures at this time of AM often. Last seizure approx 1 mo ago. Pt AxO 4 on arrival, GCS 15. Pt requiring 2L O2 at this time. Pt changed into gown, blood drawn off of existing PIV and sent to lab. Chart up for ERP.

## 2022-08-30 NOTE — ED NOTES
Med rec partially completed per patient's mother, Melissa, via landline [phone]  Melissa read labels off bottles, however we had several extra charted that she did not have bottles for.  Allergies reviewed with patients mother  Home pharmacy ([facility]) is currently closed unable to confirm full med list  Patient is unable to participate in interview

## 2022-08-30 NOTE — ED NOTES
Report given to Audrey AMIN, [facility] AMIN Apprentice. Safety measures in place. Call light within reach. Care relinquished.

## 2022-08-30 NOTE — ED NOTES
RN to bedside for active seizure. Blood coming from pt mouth. Pt unresponsive. RN notified ERP. Verbal orders received. 2mg IV lorazepam given. Pt seizure subsided, pt in post ictal state. Suction provided, jaw tightly shut. Pt desat to the 70s, placed on 15L oxymask. As of 0634, pt improved to 95%.

## 2022-08-30 NOTE — ED PROVIDER NOTES
ED Provider Note    CHIEF COMPLAINT  Chief Complaint   Patient presents with    Seizure     Seizure tonight, per family approx 10 mins. Hx seizures. On Keppra.       HPI  Jani Henry is a 31 y.o. male who presents to the emergency department by ambulance from home following a seizure.  Per EMS family heard patient seizing, lasted approximately 10 minutes.  Patient postictal on arrival but returned to baseline in route.    History of seizures, compliant with Keppra.    Patient seen evaluated at bedside per nursing request during second seizure.    HPI is limited due to patient altered mental status.     REVIEW OF SYSTEMS  See HPI for further details.  Limited as above    PAST MEDICAL HISTORY   has a past medical history of Anxiety, Bipolar 1 disorder (HCC), and Seizure (HCC).    SOCIAL HISTORY  Social History     Tobacco Use    Smoking status: Never    Smokeless tobacco: Never   Vaping Use    Vaping Use: Never used   Substance and Sexual Activity    Alcohol use: Yes     Comment: couple times a week    Drug use: Yes     Types: Inhaled     Comment: marijuana    Sexual activity: Not on file       SURGICAL HISTORY  patient denies any surgical history    CURRENT MEDICATIONS  Home Medications       Reviewed by Fidel Blum (Pharmacy Tech) on 08/30/22 at 0655  Med List Status: Partial     Medication Last Dose Status   ALPRAZolam (XANAX PO)  Active   aripiprazole (ABILIFY) 30 MG tablet UNKN Active   Cariprazine HCl (VRAYLAR PO)  Active   citalopram (CELEXA) 40 MG Tab UNKN Active   desvenlafaxine ER (KHEDEZLA) 50 MG TABLET SR 24 HR tablet UNKN Active   finasteride (PROSCAR) 5 MG Tab UNKN Active   hydrOXYzine HCl (ATARAX) 25 MG Tab UNKN Active   levETIRAcetam (KEPPRA) 500 MG Tab UNKN Active   phenytoin ER (DILANTIN) 100 MG Cap  Active                    ALLERGIES  No Known Allergies    PHYSICAL EXAM  VITAL SIGNS: BP (P) 128/85   Pulse (P) 97   Temp 36.5 °C (97.7 °F) (Temporal)   Resp (P) 16   Ht 1.829 m (6')    Wt 81.6 kg (180 lb)   SpO2 (P) 91%   BMI 24.41 kg/m²   Pulse ox interpretation: I interpret this pulse ox as normal.  Constitutional: Alert in no apparent distress.  HENT: Normocephalic, atraumatic. Bilateral external ears normal, Nose normal.   Eyes: Pupils are equal and reactive, Conjunctiva normal.   Neck: Normal range of motion, Supple  Lymphatic: No lymphadenopathy noted.   Cardiovascular: Tachycardic otherwise regular rate and rhythm, no murmurs. Distal pulses intact.    Thorax & Lungs: Normal breath sounds.  No wheezing/rales/ronchi. No increased work of breathing  Abdomen: Soft, non-distended, non-tender to palpation.   Skin: Warm, Dry, No erythema, No rash.   Musculoskeletal: Good range of motion in all major joints.   Neurologic: Alert and orient x4.  Speech clear and cohesive.  Moves 4 extremity spontaneously.  Ambulates independently.  Psychiatric: Affect normal, Judgment normal, Mood normal.       DIAGNOSTIC STUDIES / PROCEDURES    LABS  Results for orders placed or performed during the hospital encounter of 08/30/22   CBC WITH DIFFERENTIAL   Result Value Ref Range    WBC 8.6 4.8 - 10.8 K/uL    RBC 5.18 4.70 - 6.10 M/uL    Hemoglobin 16.1 14.0 - 18.0 g/dL    Hematocrit 47.8 42.0 - 52.0 %    MCV 92.3 81.4 - 97.8 fL    MCH 31.1 27.0 - 33.0 pg    MCHC 33.7 33.7 - 35.3 g/dL    RDW 42.8 35.9 - 50.0 fL    Platelet Count 307 164 - 446 K/uL    MPV 10.1 9.0 - 12.9 fL    Neutrophils-Polys 56.00 44.00 - 72.00 %    Lymphocytes 30.90 22.00 - 41.00 %    Monocytes 8.80 0.00 - 13.40 %    Eosinophils 2.40 0.00 - 6.90 %    Basophils 1.00 0.00 - 1.80 %    Immature Granulocytes 0.90 0.00 - 0.90 %    Nucleated RBC 0.00 /100 WBC    Neutrophils (Absolute) 4.81 1.82 - 7.42 K/uL    Lymphs (Absolute) 2.66 1.00 - 4.80 K/uL    Monos (Absolute) 0.76 0.00 - 0.85 K/uL    Eos (Absolute) 0.21 0.00 - 0.51 K/uL    Baso (Absolute) 0.09 0.00 - 0.12 K/uL    Immature Granulocytes (abs) 0.08 0.00 - 0.11 K/uL    NRBC (Absolute) 0.00 K/uL    COMP METABOLIC PANEL   Result Value Ref Range    Sodium 138 135 - 145 mmol/L    Potassium 4.2 3.6 - 5.5 mmol/L    Chloride 102 96 - 112 mmol/L    Co2 22 20 - 33 mmol/L    Anion Gap 14.0 7.0 - 16.0    Glucose 113 (H) 65 - 99 mg/dL    Bun 12 8 - 22 mg/dL    Creatinine 1.13 0.50 - 1.40 mg/dL    Calcium 8.8 8.5 - 10.5 mg/dL    AST(SGOT) 27 12 - 45 U/L    ALT(SGPT) 25 2 - 50 U/L    Alkaline Phosphatase 82 30 - 99 U/L    Total Bilirubin 0.3 0.1 - 1.5 mg/dL    Albumin 3.8 3.2 - 4.9 g/dL    Total Protein 6.0 6.0 - 8.2 g/dL    Globulin 2.2 1.9 - 3.5 g/dL    A-G Ratio 1.7 g/dL   MAGNESIUM   Result Value Ref Range    Magnesium 1.7 1.5 - 2.5 mg/dL   ESTIMATED GFR   Result Value Ref Range    GFR (CKD-EPI) 88 >60 mL/min/1.73 m 2       COURSE & MEDICAL DECISION MAKING  Nursing notes and vital signs were reviewed. (See chart for details)  The patients  records were reviewed, history was obtained from the patient;     Patient seen evaluated bedside per nursing request during seizure emergency department.  By my level seizure had stopped, patient was postictal, sonorous.  Ativan 2 mg given IV and Keppra 1500 mg.  He was hypoxic but this improved with supplemental oxygen.  Tachycardic without hypotension.  Will observe.    Labs are unremarkable.  No leukocytosis.  No electrolyte derangement.  Tachycardia trended towards normal, no further hypoxia on room air trial.  Mentation has returned to baseline, ANO x4, he is cooperative and conversive.  Neurologically intact and nonfocal as described in the exam.    Patient states he has seizure history just for more than 1 year, followed by Dr. Brewer.  Normally compliant with Keppra but believes he missed his dose last night.  He does drink alcohol, but only few times a week, 2 beers a couple of days ago.  No history for withdrawal or withdrawal seizure.  No hallucinations.    Patient is stable for discharge at this time, anticipatory guidance provided, continue home medications, close  follow-up is encouraged, and strict ED return instructions have been detailed. Patient is agreeable to the disposition and plan.      FINAL IMPRESSION  (R56.9) Seizure (HCC)      Electronically signed by: La Nena Freeman D.O., 8/30/2022 11:57 AM      This dictation was created using voice recognition software. The accuracy of the dictation is limited to the abilities of the software. I expect there may be some errors of grammar and possibly content. The nursing notes were reviewed and certain aspects of this information were incorporated into this note.

## 2022-11-20 ENCOUNTER — HOSPITAL ENCOUNTER (EMERGENCY)
Facility: MEDICAL CENTER | Age: 32
End: 2022-11-20
Attending: EMERGENCY MEDICINE
Payer: MEDICAID

## 2022-11-20 VITALS
TEMPERATURE: 98.1 F | HEIGHT: 72 IN | SYSTOLIC BLOOD PRESSURE: 122 MMHG | RESPIRATION RATE: 16 BRPM | HEART RATE: 73 BPM | OXYGEN SATURATION: 93 % | DIASTOLIC BLOOD PRESSURE: 72 MMHG | BODY MASS INDEX: 25.06 KG/M2 | WEIGHT: 185 LBS

## 2022-11-20 DIAGNOSIS — G40.909 SEIZURE DISORDER (HCC): ICD-10-CM

## 2022-11-20 LAB
ALBUMIN SERPL BCP-MCNC: 4.2 G/DL (ref 3.2–4.9)
ALBUMIN/GLOB SERPL: 1.6 G/DL
ALP SERPL-CCNC: 95 U/L (ref 30–99)
ALT SERPL-CCNC: 27 U/L (ref 2–50)
ANION GAP SERPL CALC-SCNC: 12 MMOL/L (ref 7–16)
AST SERPL-CCNC: 31 U/L (ref 12–45)
BILIRUB SERPL-MCNC: 0.3 MG/DL (ref 0.1–1.5)
BUN SERPL-MCNC: 9 MG/DL (ref 8–22)
CALCIUM SERPL-MCNC: 9.3 MG/DL (ref 8.5–10.5)
CHLORIDE SERPL-SCNC: 105 MMOL/L (ref 96–112)
CO2 SERPL-SCNC: 23 MMOL/L (ref 20–33)
CREAT SERPL-MCNC: 0.86 MG/DL (ref 0.5–1.4)
GFR SERPLBLD CREATININE-BSD FMLA CKD-EPI: 117 ML/MIN/1.73 M 2
GLOBULIN SER CALC-MCNC: 2.6 G/DL (ref 1.9–3.5)
GLUCOSE SERPL-MCNC: 104 MG/DL (ref 65–99)
POTASSIUM SERPL-SCNC: 4.4 MMOL/L (ref 3.6–5.5)
PROT SERPL-MCNC: 6.8 G/DL (ref 6–8.2)
SODIUM SERPL-SCNC: 140 MMOL/L (ref 135–145)

## 2022-11-20 PROCEDURE — 99284 EMERGENCY DEPT VISIT MOD MDM: CPT

## 2022-11-20 PROCEDURE — 700102 HCHG RX REV CODE 250 W/ 637 OVERRIDE(OP): Performed by: EMERGENCY MEDICINE

## 2022-11-20 PROCEDURE — 700111 HCHG RX REV CODE 636 W/ 250 OVERRIDE (IP): Performed by: EMERGENCY MEDICINE

## 2022-11-20 PROCEDURE — 36415 COLL VENOUS BLD VENIPUNCTURE: CPT

## 2022-11-20 PROCEDURE — 80177 DRUG SCRN QUAN LEVETIRACETAM: CPT

## 2022-11-20 PROCEDURE — A9270 NON-COVERED ITEM OR SERVICE: HCPCS | Performed by: EMERGENCY MEDICINE

## 2022-11-20 PROCEDURE — 96374 THER/PROPH/DIAG INJ IV PUSH: CPT

## 2022-11-20 PROCEDURE — 80053 COMPREHEN METABOLIC PANEL: CPT

## 2022-11-20 RX ORDER — LORAZEPAM 2 MG/1
2 TABLET ORAL ONCE
Status: COMPLETED | OUTPATIENT
Start: 2022-11-20 | End: 2022-11-20

## 2022-11-20 RX ORDER — DESVENLAFAXINE 100 MG/1
100 TABLET, EXTENDED RELEASE ORAL DAILY
COMMUNITY

## 2022-11-20 RX ORDER — LEVETIRACETAM 500 MG/1
1000 TABLET ORAL 2 TIMES DAILY
Qty: 60 TABLET | Refills: 0 | Status: SHIPPED | OUTPATIENT
Start: 2022-11-20 | End: 2022-12-20

## 2022-11-20 RX ORDER — LEVETIRACETAM 500 MG/5ML
1000 INJECTION, SOLUTION, CONCENTRATE INTRAVENOUS ONCE
Status: COMPLETED | OUTPATIENT
Start: 2022-11-20 | End: 2022-11-20

## 2022-11-20 RX ADMIN — LEVETIRACETAM 1000 MG: 100 INJECTION, SOLUTION INTRAVENOUS at 09:18

## 2022-11-20 RX ADMIN — LORAZEPAM 2 MG: 2 TABLET ORAL at 09:18

## 2022-11-20 ASSESSMENT — FIBROSIS 4 INDEX: FIB4 SCORE: 0.58

## 2022-11-20 NOTE — ED NOTES
Med Rec Complete per patient  Allergies Reviewed with patient  No antibiotics within the last 30 days  Patient's Preferred Pharmacy:  [facility] on [address]      &    [facility]  ([place] )    Patient would like to receive medications via Meds to Beds if receiving prescriptions upon discharge.    Patient takes an exotic pain medication called Kratom which comes in packet & mixed with water before taking.

## 2022-11-20 NOTE — ED PROVIDER NOTES
ED Provider Note    CHIEF COMPLAINT  Chief Complaint   Patient presents with    Seizure     Pt arrives via EMS from home with c/o seizure. Pt's parents called EMS. Pt states that he remembers waking up to use the restroom at 0730 and went back to sleep. Unknown length of time of seizure. States that he has not had a sz since summer and has been taking his Keppra as prescribed. Pt was originally post ictal upon scene, but confused. Pt now axox4. Denies any complaints.        HPI  Jani Henry is a 32 y.o. male who presents \for evaluation of seizure activity.  The patient was brought in by ambulance from home.  Apparently has not underlying seizure disorder that was diagnosed around 18 months ago.  He is followed by Dr. Brewer.  He is on Keppra.  Last seizure is around 2 to 3 months ago.  He reports he takes his Keppra regularly but occasionally misses a dose.  He reports he takes it twice a day thinks he took it last night missed his morning dose.  He apparently had a witnessed tonic-clonic seizure.  He did bite his tongue but there is no report of any head trauma or incontinence.  He arrives here lucid with stable and normal vital signs.  No focal numbness weakness or tingling on history.  No report of any fevers chills or headache    REVIEW OF SYSTEMS  See HPI for further details.  No loss of consciousness numbness weakness or tingling all other systems are negative.     PAST MEDICAL HISTORY  Past Medical History:   Diagnosis Date    Anxiety     Bipolar 1 disorder (HCC)     Seizure (HCC)        FAMILY HISTORY  Noncontributory    SOCIAL HISTORY  Social History     Socioeconomic History    Marital status: Single   Tobacco Use    Smoking status: Never    Smokeless tobacco: Never   Vaping Use    Vaping Use: Never used   Substance and Sexual Activity    Alcohol use: Yes     Comment: couple times a week    Drug use: Yes     Types: Inhaled     Comment: marijuana   No IV drugs    SURGICAL HISTORY  No past surgical  history on file.    CURRENT MEDICATIONS  No current facility-administered medications for this encounter.    Current Outpatient Medications:     levETIRAcetam (KEPPRA) 500 MG Tab, Take 1,000 mg by mouth 2 times a day., Disp: , Rfl:     hydrOXYzine HCl (ATARAX) 25 MG Tab, Take 25-50 mg by mouth 2 times a day. 1 tablet as needed for panic attacks 2 tablets at bedtime as needed for insomnia, Disp: , Rfl:     aripiprazole (ABILIFY) 30 MG tablet, Take 30 mg by mouth every day., Disp: , Rfl:     citalopram (CELEXA) 40 MG Tab, Take 40 mg by mouth every day., Disp: , Rfl:     desvenlafaxine ER (KHEDEZLA) 50 MG TABLET SR 24 HR tablet, Take 50 mg by mouth every day., Disp: , Rfl:     finasteride (PROSCAR) 5 MG Tab, Take 5 mg by mouth every day., Disp: , Rfl:     phenytoin ER (DILANTIN) 100 MG Cap, Take 1 Capsule by mouth 3 times a day., Disp: 90 Capsule, Rfl: 11    ALPRAZolam (XANAX PO), Take  by mouth., Disp: , Rfl:     Cariprazine HCl (VRAYLAR PO), Take  by mouth., Disp: , Rfl:         ALLERGIES  No Known Allergies    PHYSICAL EXAM  VITAL SIGNS: Pulse 90   Temp 37.1 °C (98.7 °F) (Temporal)   Resp 18   Ht 1.829 m (6')   Wt 83.9 kg (185 lb)   SpO2 94%   BMI 25.09 kg/m²       Constitutional: Well developed, Well nourished, No acute distress, Non-toxic appearance.   HENT: Normocephalic, Atraumatic, Bilateral external ears normal, Oropharynx moist, No oral exudates, Nose normal.  Superficial contusions noted to the lower lip and tongue on suturable.  No active bleeding  Eyes: PERRLA, EOMI, Conjunctiva normal, No discharge.   Neck: Normal range of motion, No tenderness, Supple, No stridor.   Cardiovascular: Normal heart rate, Normal rhythm, No murmurs, No rubs, No gallops.   Thorax & Lungs: Normal breath sounds, No respiratory distress, No wheezing, No chest tenderness.   Abdomen: Bowel sounds normal, Soft, No tenderness, No masses, No pulsatile masses.   Skin: Warm, Dry, No erythema, No rash.   Back: No tenderness, No CVA  tenderness.   Extremities: Intact distal pulses, No edema, No tenderness, No cyanosis, No clubbing.   Musculoskeletal: Good range of motion in all major joints. No tenderness to palpation or major deformities noted.   Neurologic: Cranial nerves II through XII grossly intact GCS 15 NIH stroke scale score of 0 no active seizures alert & oriented x 3, Normal motor function, Normal sensory function, No focal deficits noted.     COURSE & MEDICAL DECISION MAKING  Pertinent Labs & Imaging studies reviewed. (See chart for details)  Results for orders placed or performed during the hospital encounter of 11/20/22   Comp Metabolic Panel   Result Value Ref Range    Sodium 140 135 - 145 mmol/L    Potassium 4.4 3.6 - 5.5 mmol/L    Chloride 105 96 - 112 mmol/L    Co2 23 20 - 33 mmol/L    Anion Gap 12.0 7.0 - 16.0    Glucose 104 (H) 65 - 99 mg/dL    Bun 9 8 - 22 mg/dL    Creatinine 0.86 0.50 - 1.40 mg/dL    Calcium 9.3 8.5 - 10.5 mg/dL    AST(SGOT) 31 12 - 45 U/L    ALT(SGPT) 27 2 - 50 U/L    Alkaline Phosphatase 95 30 - 99 U/L    Total Bilirubin 0.3 0.1 - 1.5 mg/dL    Albumin 4.2 3.2 - 4.9 g/dL    Total Protein 6.8 6.0 - 8.2 g/dL    Globulin 2.6 1.9 - 3.5 g/dL    A-G Ratio 1.6 g/dL   ESTIMATED GFR   Result Value Ref Range    GFR (CKD-EPI) 117 >60 mL/min/1.73 m 2     An IV was established.  The patient was given 1001 g load of Keppra.  Turns out he is only on 500 mg twice daily which is relatively small dose.  He was also given a dose of oral Ativan.  I reviewed his records.  He has had brain imaging which is normal and he has a nonfocal neurological exam.  He is observed for almost 2 hours and did not have any additional seizure activity.  We will increase him to 8000 twice daily of Keppra and recommend he follow-up with his neurologist within 1 to 2 weeks,    FINAL IMPRESSION  1.  Seizure with history of seizure disorder      Electronically signed by: Martin Garcia M.D., 11/20/2022 9:23 AM

## 2022-11-20 NOTE — ED TRIAGE NOTES
Pt arrives via EMS from home with c/o seizure. Pt's parents called EMS. Pt states that he remembers waking up to use the restroom at 0730 and went back to sleep. Unknown length of time of seizure. States that he has not had a sz since summer and has been taking his Keppra as prescribed. Pt was originally post ictal upon scene, but confused. Pt now axox4. Denies any complaints.

## 2022-11-21 LAB — LEVETIRACETAM SERPL-MCNC: 19 UG/ML (ref 10–40)
